# Patient Record
Sex: FEMALE
[De-identification: names, ages, dates, MRNs, and addresses within clinical notes are randomized per-mention and may not be internally consistent; named-entity substitution may affect disease eponyms.]

---

## 2022-07-18 PROBLEM — Z00.00 ENCOUNTER FOR PREVENTIVE HEALTH EXAMINATION: Status: ACTIVE | Noted: 2022-07-18

## 2022-07-19 ENCOUNTER — APPOINTMENT (OUTPATIENT)
Dept: ORTHOPEDIC SURGERY | Facility: CLINIC | Age: 22
End: 2022-07-19

## 2022-07-19 VITALS — SYSTOLIC BLOOD PRESSURE: 118 MMHG | DIASTOLIC BLOOD PRESSURE: 79 MMHG | HEART RATE: 92 BPM

## 2022-07-19 DIAGNOSIS — M67.432 GANGLION, LEFT WRIST: ICD-10-CM

## 2022-07-19 PROCEDURE — 99203 OFFICE O/P NEW LOW 30 MIN: CPT

## 2022-07-20 RX ORDER — ALBUTEROL SULFATE 90 UG/1
108 (90 BASE) INHALANT RESPIRATORY (INHALATION)
Qty: 7 | Refills: 0 | Status: ACTIVE | COMMUNITY
Start: 2022-03-26

## 2022-07-20 RX ORDER — AZITHROMYCIN 200 MG/5ML
200 POWDER, FOR SUSPENSION ORAL
Qty: 45 | Refills: 0 | Status: COMPLETED | COMMUNITY
Start: 2022-03-26

## 2022-07-20 RX ORDER — PREDNISOLONE SODIUM PHOSPHATE 20 MG/5ML
20 SOLUTION ORAL
Qty: 50 | Refills: 0 | Status: COMPLETED | COMMUNITY
Start: 2022-03-27

## 2022-07-20 NOTE — HISTORY OF PRESENT ILLNESS
[FreeTextEntry1] : The patient is 21 yo RHD hand writing; LHD for sports female who presents for hand evaluation.\par [place] [employer].\par Hearing and Speech major.\par \par Pt has dorsal wrist pain left wrist.\par Can be severely painful.\par First began 5-6 yrs ago.\par Pain is highly intermittent.\par Patient is aware of her swelling of the dorsum of the left wrist that can get larger and can get smaller.  \par Patient states that today it is painless.\par During exam patient palpated scapholunate interval and confirmed the small mass and stated that that was the location of pain that she experiences\par \par Pain may last for several days or a week or 2 consecutively\par Or the pain may be absent for many months consecutively\par Patient is not consciously aware of it associated with any activity or position\par Patient does not recall a traumatic episode.\par Patient reports that she is not particularly athletic and has not been using the hands for strenuous activities with any regularity.\par Patient denies numbness tingling or triggering on the left.\par Patient denies any mass or ganglion on the right.\par Patient denies numbness tingling or triggering on the right.

## 2022-07-20 NOTE — PHYSICAL EXAM
[de-identified] : Left Wrist\par Sessile mass overlying scapholunate approximately 10 mm diameter\par Nontender\par (During exam patient palpated scapholunate interval and confirmed the small mass.  Mass nontender today)\par (Pt acknowledged that the mass was at location of pain that she experiences)\par \par Wrist extension/flexion 70 degrees / 70 degrees\par Mild dorsal wrist pain at maximum wrist extension\par No pain with flexion\par Scaphoid shift excellent stability no pain\par Basal joint 4+ laxity no pain or crepitus\par Finger MP joints hyperextend 80 degrees\par Triggering\par No volar ganglion on the left\par \par Right wrist\par No dorsal masses at scapholunate.\par No tenderness over scapholunate.\par Extension/flexion 60/60 degrees without pain\par Scaphoid shift good stability no pain\par No other notable wrist findings.\par \par Right hand\par No pertinent CMC, MP, PIP, or DIP joint contributory finding.  \par No A1 pulley tenderness and no triggering in any finger.  \par \par Neurologic: Median, ulnar, and radial motor and sensory are intact. \par Skin: No cyanosis, clubbing, or rashes.\par Vascular: Radial pulses intact.\par Lymphatic: No streaking or epitrochlear adenopathy.\par The patient is awake, alert, and oriented. Affect appropriate. Cooperative. \par \par Ligamentous laxity testing\par Bilateral elbow extension 0 degrees\par Bilateral knee hyperextension 10 degrees\par Finger MP joints hyperextend approximately 80 degrees\par Maximum passive thumb abduction is several centimeters from forearm [de-identified] : Deferred

## 2022-07-20 NOTE — DISCUSSION/SUMMARY
[FreeTextEntry1] : Patient has had left wrist pain intermittently.  There is a very subtle sessile mass over the dorsum of the left wrist scapholunate interval that is consistent with an occult ganglion.  Patient palpated the area and acknowledges that this is the area where she has tenderness.  There is very slight discomfort today with firm palpation in this area.  Physical exam was otherwise noncontributory: No pain with scaphoid shift, minimal to no pain at extreme wrist extension; no pain with flexion.  No other pertinent positive hand findings.\par \par By history and physical exam the patient most likely has a small/occult ganglion over the dorsum of left wrist at scapholunate interval.  I have discussed treatment options risks and complications.\par Monitoring was chosen by patient and is a reasonable option.\par Ultrasound-guided needle perforation the cyst and cortisone injection at base of cyst at scapholunate is a reasonable treatment option.\par If pain becomes problematic, then surgical excision would be a consideration.\par X-rays deferred because patient does not need treatment today.\par If symptoms recur patient should communicate with office.  If patient would like ultrasound-guided needle perforation and cortisone injection, I will refer her to Ivana Trujillo MD or [facility] radiology.\par Surgery is not recommended at this time but would be a serious consideration as noted above.\par If patient has notable symptoms when at college, she will be referred to hand surgeon in the [facility] area.\par Return as needed\par Prognosis limited.\par A lengthy and detailed discussion was held with the patient and her mother regarding analysis, treatment, and recommendations. All questions have been answered. At the conclusion the patient and her mother expressed acceptance and understanding.